# Patient Record
Sex: FEMALE | Race: ASIAN | Employment: UNEMPLOYED | URBAN - METROPOLITAN AREA
[De-identification: names, ages, dates, MRNs, and addresses within clinical notes are randomized per-mention and may not be internally consistent; named-entity substitution may affect disease eponyms.]

---

## 2022-03-05 ENCOUNTER — APPOINTMENT (OUTPATIENT)
Dept: CT IMAGING | Age: 52
End: 2022-03-05
Payer: COMMERCIAL

## 2022-03-05 ENCOUNTER — APPOINTMENT (OUTPATIENT)
Dept: GENERAL RADIOLOGY | Age: 52
End: 2022-03-05
Payer: COMMERCIAL

## 2022-03-05 ENCOUNTER — HOSPITAL ENCOUNTER (OUTPATIENT)
Age: 52
Setting detail: OBSERVATION
Discharge: HOME | End: 2022-03-07
Attending: STUDENT IN AN ORGANIZED HEALTH CARE EDUCATION/TRAINING PROGRAM | Admitting: INTERNAL MEDICINE
Payer: COMMERCIAL

## 2022-03-05 ENCOUNTER — APPOINTMENT (OUTPATIENT)
Dept: MRI IMAGING | Age: 52
End: 2022-03-05
Payer: COMMERCIAL

## 2022-03-05 DIAGNOSIS — R53.1 LEFT-SIDED WEAKNESS: Primary | ICD-10-CM

## 2022-03-05 DIAGNOSIS — K11.6 CYST OF BOTH PAROTID GLANDS: ICD-10-CM

## 2022-03-05 PROBLEM — R29.90 STROKE-LIKE SYMPTOMS: Status: ACTIVE | Noted: 2022-03-05

## 2022-03-05 LAB
A/G RATIO: 1.6 (ref 1.1–2.2)
ALBUMIN SERPL-MCNC: 4.6 G/DL (ref 3.4–5)
ALP BLD-CCNC: 88 U/L (ref 40–129)
ALT SERPL-CCNC: 19 U/L (ref 10–40)
ANION GAP SERPL CALCULATED.3IONS-SCNC: 11 MMOL/L (ref 3–16)
ANION GAP SERPL CALCULATED.3IONS-SCNC: 16 MMOL/L (ref 3–16)
AST SERPL-CCNC: 15 U/L (ref 15–37)
BASOPHILS ABSOLUTE: 0.1 K/UL (ref 0–0.2)
BASOPHILS ABSOLUTE: 0.1 K/UL (ref 0–0.2)
BASOPHILS RELATIVE PERCENT: 1 %
BASOPHILS RELATIVE PERCENT: 1.2 %
BILIRUB SERPL-MCNC: 0.4 MG/DL (ref 0–1)
BUN BLDV-MCNC: 11 MG/DL (ref 7–20)
BUN BLDV-MCNC: 9 MG/DL (ref 7–20)
CALCIUM SERPL-MCNC: 9.3 MG/DL (ref 8.3–10.6)
CALCIUM SERPL-MCNC: 9.6 MG/DL (ref 8.3–10.6)
CHLORIDE BLD-SCNC: 102 MMOL/L (ref 99–110)
CHLORIDE BLD-SCNC: 104 MMOL/L (ref 99–110)
CHOLESTEROL, TOTAL: 191 MG/DL (ref 0–199)
CHP ED QC CHECK: NORMAL
CO2: 20 MMOL/L (ref 21–32)
CO2: 24 MMOL/L (ref 21–32)
CREAT SERPL-MCNC: 0.6 MG/DL (ref 0.6–1.1)
CREAT SERPL-MCNC: 0.8 MG/DL (ref 0.6–1.1)
EKG ATRIAL RATE: 87 BPM
EKG DIAGNOSIS: NORMAL
EKG P AXIS: 58 DEGREES
EKG P-R INTERVAL: 128 MS
EKG Q-T INTERVAL: 342 MS
EKG QRS DURATION: 84 MS
EKG QTC CALCULATION (BAZETT): 411 MS
EKG R AXIS: 9 DEGREES
EKG T AXIS: 10 DEGREES
EKG VENTRICULAR RATE: 87 BPM
EOSINOPHILS ABSOLUTE: 0.2 K/UL (ref 0–0.6)
EOSINOPHILS ABSOLUTE: 0.4 K/UL (ref 0–0.6)
EOSINOPHILS RELATIVE PERCENT: 2.9 %
EOSINOPHILS RELATIVE PERCENT: 5.1 %
GFR AFRICAN AMERICAN: >60
GFR AFRICAN AMERICAN: >60
GFR NON-AFRICAN AMERICAN: >60
GFR NON-AFRICAN AMERICAN: >60
GLUCOSE BLD-MCNC: 108 MG/DL
GLUCOSE BLD-MCNC: 108 MG/DL (ref 70–99)
GLUCOSE BLD-MCNC: 120 MG/DL (ref 70–99)
GLUCOSE BLD-MCNC: 99 MG/DL (ref 70–99)
HCT VFR BLD CALC: 38.8 % (ref 36–48)
HCT VFR BLD CALC: 39.6 % (ref 36–48)
HDLC SERPL-MCNC: 50 MG/DL (ref 40–60)
HEMOGLOBIN: 13.3 G/DL (ref 12–16)
HEMOGLOBIN: 13.4 G/DL (ref 12–16)
INR BLD: 0.91 (ref 0.88–1.12)
LDL CHOLESTEROL CALCULATED: 118 MG/DL
LV EF: 60 %
LVEF MODALITY: NORMAL
LYMPHOCYTES ABSOLUTE: 2.8 K/UL (ref 1–5.1)
LYMPHOCYTES ABSOLUTE: 3.3 K/UL (ref 1–5.1)
LYMPHOCYTES RELATIVE PERCENT: 40.2 %
LYMPHOCYTES RELATIVE PERCENT: 42.6 %
MCH RBC QN AUTO: 30 PG (ref 26–34)
MCH RBC QN AUTO: 30.4 PG (ref 26–34)
MCHC RBC AUTO-ENTMCNC: 33.7 G/DL (ref 31–36)
MCHC RBC AUTO-ENTMCNC: 34.2 G/DL (ref 31–36)
MCV RBC AUTO: 88.9 FL (ref 80–100)
MCV RBC AUTO: 88.9 FL (ref 80–100)
MONOCYTES ABSOLUTE: 0.4 K/UL (ref 0–1.3)
MONOCYTES ABSOLUTE: 0.6 K/UL (ref 0–1.3)
MONOCYTES RELATIVE PERCENT: 6.3 %
MONOCYTES RELATIVE PERCENT: 7.4 %
NEUTROPHILS ABSOLUTE: 3.4 K/UL (ref 1.7–7.7)
NEUTROPHILS ABSOLUTE: 3.4 K/UL (ref 1.7–7.7)
NEUTROPHILS RELATIVE PERCENT: 43.9 %
NEUTROPHILS RELATIVE PERCENT: 49.4 %
PDW BLD-RTO: 13.7 % (ref 12.4–15.4)
PDW BLD-RTO: 13.8 % (ref 12.4–15.4)
PERFORMED ON: ABNORMAL
PLATELET # BLD: 249 K/UL (ref 135–450)
PLATELET # BLD: 266 K/UL (ref 135–450)
PMV BLD AUTO: 7.3 FL (ref 5–10.5)
PMV BLD AUTO: 7.3 FL (ref 5–10.5)
POTASSIUM REFLEX MAGNESIUM: 3.7 MMOL/L (ref 3.5–5.1)
POTASSIUM REFLEX MAGNESIUM: 4.2 MMOL/L (ref 3.5–5.1)
PROTHROMBIN TIME: 10.4 SEC (ref 9.9–12.7)
RBC # BLD: 4.36 M/UL (ref 4–5.2)
RBC # BLD: 4.46 M/UL (ref 4–5.2)
SODIUM BLD-SCNC: 138 MMOL/L (ref 136–145)
SODIUM BLD-SCNC: 139 MMOL/L (ref 136–145)
TOTAL PROTEIN: 7.4 G/DL (ref 6.4–8.2)
TRIGL SERPL-MCNC: 114 MG/DL (ref 0–150)
TROPONIN: <0.01 NG/ML
VLDLC SERPL CALC-MCNC: 23 MG/DL
WBC # BLD: 6.9 K/UL (ref 4–11)
WBC # BLD: 7.8 K/UL (ref 4–11)

## 2022-03-05 PROCEDURE — 36415 COLL VENOUS BLD VENIPUNCTURE: CPT

## 2022-03-05 PROCEDURE — 70496 CT ANGIOGRAPHY HEAD: CPT

## 2022-03-05 PROCEDURE — 85610 PROTHROMBIN TIME: CPT

## 2022-03-05 PROCEDURE — 6370000000 HC RX 637 (ALT 250 FOR IP): Performed by: STUDENT IN AN ORGANIZED HEALTH CARE EDUCATION/TRAINING PROGRAM

## 2022-03-05 PROCEDURE — 9990000010 HC NO CHARGE VISIT: Performed by: PHYSICAL THERAPIST

## 2022-03-05 PROCEDURE — 70450 CT HEAD/BRAIN W/O DYE: CPT

## 2022-03-05 PROCEDURE — 97161 PT EVAL LOW COMPLEX 20 MIN: CPT | Performed by: PHYSICAL THERAPIST

## 2022-03-05 PROCEDURE — 6370000000 HC RX 637 (ALT 250 FOR IP): Performed by: INTERNAL MEDICINE

## 2022-03-05 PROCEDURE — 6360000004 HC RX CONTRAST MEDICATION: Performed by: STUDENT IN AN ORGANIZED HEALTH CARE EDUCATION/TRAINING PROGRAM

## 2022-03-05 PROCEDURE — 71045 X-RAY EXAM CHEST 1 VIEW: CPT

## 2022-03-05 PROCEDURE — 97530 THERAPEUTIC ACTIVITIES: CPT | Performed by: PHYSICAL THERAPIST

## 2022-03-05 PROCEDURE — 70551 MRI BRAIN STEM W/O DYE: CPT

## 2022-03-05 PROCEDURE — 85025 COMPLETE CBC W/AUTO DIFF WBC: CPT

## 2022-03-05 PROCEDURE — G0378 HOSPITAL OBSERVATION PER HR: HCPCS

## 2022-03-05 PROCEDURE — 80061 LIPID PANEL: CPT

## 2022-03-05 PROCEDURE — C8929 TTE W OR WO FOL WCON,DOPPLER: HCPCS

## 2022-03-05 PROCEDURE — 94761 N-INVAS EAR/PLS OXIMETRY MLT: CPT

## 2022-03-05 PROCEDURE — 6360000002 HC RX W HCPCS: Performed by: INTERNAL MEDICINE

## 2022-03-05 PROCEDURE — 97165 OT EVAL LOW COMPLEX 30 MIN: CPT

## 2022-03-05 PROCEDURE — 97530 THERAPEUTIC ACTIVITIES: CPT

## 2022-03-05 PROCEDURE — 93005 ELECTROCARDIOGRAM TRACING: CPT | Performed by: STUDENT IN AN ORGANIZED HEALTH CARE EDUCATION/TRAINING PROGRAM

## 2022-03-05 PROCEDURE — 80053 COMPREHEN METABOLIC PANEL: CPT

## 2022-03-05 PROCEDURE — 99285 EMERGENCY DEPT VISIT HI MDM: CPT

## 2022-03-05 PROCEDURE — 93010 ELECTROCARDIOGRAM REPORT: CPT | Performed by: INTERNAL MEDICINE

## 2022-03-05 PROCEDURE — 83036 HEMOGLOBIN GLYCOSYLATED A1C: CPT

## 2022-03-05 PROCEDURE — 84484 ASSAY OF TROPONIN QUANT: CPT

## 2022-03-05 PROCEDURE — 70491 CT SOFT TISSUE NECK W/DYE: CPT

## 2022-03-05 PROCEDURE — 96372 THER/PROPH/DIAG INJ SC/IM: CPT

## 2022-03-05 RX ORDER — LISINOPRIL 20 MG/1
20 TABLET ORAL DAILY
Status: DISCONTINUED | OUTPATIENT
Start: 2022-03-05 | End: 2022-03-05

## 2022-03-05 RX ORDER — ASPIRIN 300 MG/1
300 SUPPOSITORY RECTAL DAILY
Status: DISCONTINUED | OUTPATIENT
Start: 2022-03-05 | End: 2022-03-07 | Stop reason: HOSPADM

## 2022-03-05 RX ORDER — METOPROLOL SUCCINATE 50 MG/1
50 TABLET, EXTENDED RELEASE ORAL DAILY
Status: DISCONTINUED | OUTPATIENT
Start: 2022-03-05 | End: 2022-03-05

## 2022-03-05 RX ORDER — ONDANSETRON 4 MG/1
4 TABLET, ORALLY DISINTEGRATING ORAL EVERY 8 HOURS PRN
Status: DISCONTINUED | OUTPATIENT
Start: 2022-03-05 | End: 2022-03-07 | Stop reason: HOSPADM

## 2022-03-05 RX ORDER — ASPIRIN 81 MG/1
81 TABLET ORAL DAILY
Status: DISCONTINUED | OUTPATIENT
Start: 2022-03-05 | End: 2022-03-07 | Stop reason: HOSPADM

## 2022-03-05 RX ORDER — ONDANSETRON 2 MG/ML
4 INJECTION INTRAMUSCULAR; INTRAVENOUS EVERY 6 HOURS PRN
Status: DISCONTINUED | OUTPATIENT
Start: 2022-03-05 | End: 2022-03-07 | Stop reason: HOSPADM

## 2022-03-05 RX ORDER — ACETAMINOPHEN 325 MG/1
650 TABLET ORAL EVERY 4 HOURS PRN
Status: DISCONTINUED | OUTPATIENT
Start: 2022-03-05 | End: 2022-03-07 | Stop reason: HOSPADM

## 2022-03-05 RX ORDER — AMLODIPINE BESYLATE 5 MG/1
5 TABLET ORAL DAILY
COMMUNITY

## 2022-03-05 RX ORDER — MAGNESIUM OXIDE 400 MG/1
400 TABLET ORAL DAILY
COMMUNITY

## 2022-03-05 RX ORDER — ZINC GLUCONATE 50 MG
50 TABLET ORAL DAILY
COMMUNITY

## 2022-03-05 RX ORDER — ATORVASTATIN CALCIUM 80 MG/1
80 TABLET, FILM COATED ORAL NIGHTLY
Status: DISCONTINUED | OUTPATIENT
Start: 2022-03-05 | End: 2022-03-07 | Stop reason: HOSPADM

## 2022-03-05 RX ORDER — POLYETHYLENE GLYCOL 3350 17 G/17G
17 POWDER, FOR SOLUTION ORAL DAILY PRN
Status: DISCONTINUED | OUTPATIENT
Start: 2022-03-05 | End: 2022-03-07 | Stop reason: HOSPADM

## 2022-03-05 RX ORDER — LOSARTAN POTASSIUM 50 MG/1
50 TABLET ORAL NIGHTLY
Status: DISCONTINUED | OUTPATIENT
Start: 2022-03-05 | End: 2022-03-07 | Stop reason: HOSPADM

## 2022-03-05 RX ADMIN — ATORVASTATIN CALCIUM 80 MG: 80 TABLET, FILM COATED ORAL at 20:09

## 2022-03-05 RX ADMIN — ENOXAPARIN SODIUM 40 MG: 100 INJECTION SUBCUTANEOUS at 08:46

## 2022-03-05 RX ADMIN — ACETAMINOPHEN 650 MG: 325 TABLET ORAL at 20:09

## 2022-03-05 RX ADMIN — ASPIRIN 81 MG: 81 TABLET, COATED ORAL at 08:46

## 2022-03-05 RX ADMIN — LOSARTAN POTASSIUM 50 MG: 50 TABLET, FILM COATED ORAL at 20:09

## 2022-03-05 RX ADMIN — LIDOCAINE HYDROCHLORIDE: 20 SOLUTION ORAL; TOPICAL at 04:17

## 2022-03-05 RX ADMIN — IOPAMIDOL 100 ML: 755 INJECTION, SOLUTION INTRAVENOUS at 03:30

## 2022-03-05 RX ADMIN — IOPAMIDOL 100 ML: 755 INJECTION, SOLUTION INTRAVENOUS at 14:50

## 2022-03-05 ASSESSMENT — PAIN DESCRIPTION - LOCATION
LOCATION: LEG
LOCATION: LEG;CHEST
LOCATION: CHEST;SHOULDER;LEG
LOCATION: HEAD
LOCATION: CHEST
LOCATION: GENERALIZED

## 2022-03-05 ASSESSMENT — PAIN SCALES - GENERAL
PAINLEVEL_OUTOF10: 6
PAINLEVEL_OUTOF10: 5
PAINLEVEL_OUTOF10: 6
PAINLEVEL_OUTOF10: 3
PAINLEVEL_OUTOF10: 0
PAINLEVEL_OUTOF10: 7
PAINLEVEL_OUTOF10: 6

## 2022-03-05 ASSESSMENT — PAIN DESCRIPTION - ORIENTATION
ORIENTATION: RIGHT;LEFT
ORIENTATION: LEFT
ORIENTATION: RIGHT;LEFT
ORIENTATION: RIGHT;LEFT
ORIENTATION: LEFT

## 2022-03-05 ASSESSMENT — PAIN DESCRIPTION - ONSET
ONSET: ON-GOING

## 2022-03-05 ASSESSMENT — PAIN - FUNCTIONAL ASSESSMENT
PAIN_FUNCTIONAL_ASSESSMENT: ACTIVITIES ARE NOT PREVENTED
PAIN_FUNCTIONAL_ASSESSMENT: 0-10
PAIN_FUNCTIONAL_ASSESSMENT: ACTIVITIES ARE NOT PREVENTED
PAIN_FUNCTIONAL_ASSESSMENT: 0-10
PAIN_FUNCTIONAL_ASSESSMENT: ACTIVITIES ARE NOT PREVENTED

## 2022-03-05 ASSESSMENT — PAIN DESCRIPTION - PAIN TYPE
TYPE: ACUTE PAIN;CHRONIC PAIN
TYPE: ACUTE PAIN
TYPE: ACUTE PAIN
TYPE: CHRONIC PAIN
TYPE: ACUTE PAIN;CHRONIC PAIN

## 2022-03-05 ASSESSMENT — PAIN DESCRIPTION - FREQUENCY
FREQUENCY: INTERMITTENT
FREQUENCY: CONTINUOUS
FREQUENCY: INTERMITTENT
FREQUENCY: INTERMITTENT
FREQUENCY: CONTINUOUS
FREQUENCY: CONTINUOUS

## 2022-03-05 ASSESSMENT — PAIN DESCRIPTION - DESCRIPTORS
DESCRIPTORS: OTHER (COMMENT)
DESCRIPTORS: SHARP;TIGHTNESS
DESCRIPTORS: ACHING
DESCRIPTORS: ACHING
DESCRIPTORS: TIGHTNESS

## 2022-03-05 ASSESSMENT — PAIN DESCRIPTION - PROGRESSION
CLINICAL_PROGRESSION: GRADUALLY WORSENING
CLINICAL_PROGRESSION: GRADUALLY WORSENING
CLINICAL_PROGRESSION: NOT CHANGED
CLINICAL_PROGRESSION: GRADUALLY IMPROVING
CLINICAL_PROGRESSION: NOT CHANGED
CLINICAL_PROGRESSION: NOT CHANGED

## 2022-03-05 NOTE — ED NOTES
[facility] transport team here to take patient to [facility]. Patient is agreeable to transfer. Report is given to transport team and all questions answered.       Christiane Conrad RN  03/05/22 7062

## 2022-03-05 NOTE — PROGRESS NOTES
Occupational Therapy  Attempt Note    Calderon Bradley  3/5/2022    OT orders received. OT attempted to see for OT eval/tx, but was unable to see secondary to pt being out of room at MRI . Will attempt again later as time permits.     Mary Fajardo, OTR/L [id number]

## 2022-03-05 NOTE — PROGRESS NOTES
Medication Reconciliation    List of medications patient is currently taking is complete. Source of information: 1. Conversation with patient at bedside                                      2. EPIC records      Allergies  Codeine and Codeine     Notes regarding home medications:   1. Patient 's prescription medicines include only amlodipine and losartan.  All other prescription medications removed from list.

## 2022-03-05 NOTE — PROGRESS NOTES
NAME:  Esperanza Zaldivar  YOB: 1970  MEDICAL RECORD NUMBER:  [id number]  TODAYS DATE:  03/05/2022    Discussed personal risk factors for Stroke /TIA with patient/family, and ways to reduce the risk for a recurrent stroke. Patient's personal risk factors which were identified are:     [] Alcohol Abuse: check with your physician before any alcohol consumption. [] Atrial fibrillation: may cause blood clots. [] Drug Abuse: Seek help, talk with your doctor  [] Clotting Disorder  [] Diabetes  [x] Family history of stroke or heart disease  [x] High Blood Pressure/Hypertension: work with your physician.  [] High cholesterol: monitor cholesterol levels with your physician.   [] Overweight/Obesity: work with your physician for your ideal body weight.  [] Physical Inactivity: get regular exercise as directed by your physician. [] Personal history of previous TIA or stroke  [x] Poor Diet; decrease salt (sodium) in your diet, follow diet directed by physician. [] Smoking: Cigarette/Cigar: stop smoking. Advised pt. that you can reduce your risk for stroke/TIA by modifying/controlling the risk factors that you have. Pt.advised to take the medications as prescribed, which will be detailed in the discharge instructions, and to not stop taking them without consulting their physician. In addition, pt. advised to maintain a healthy diet, exercise regularly and to not smoke. [facility]'s Stroke treatment and prevention, Managing your recovery  notebook  provided and/or reviewed  with patient/family. The notebook includes, but not limited to, sections addressing warning signs & symptoms of a stroke, which are: sudden numbness or weakness especially on one side of the body, sudden confusion, difficulty speaking or understanding, sudden changes in vision, sudden dizziness or loss of balance/ coordination, or sudden severe headache.   The need to call EMS (911) immediately if signs & symptoms occur is emphasized . The notebook also provides education on Stroke community resources and stroke advocacy. The need for follow-up after discharge was highlighted with patient/family with them being able to repeat understanding of the importance of this.       Electronically signed by Symone Appiah RN

## 2022-03-05 NOTE — ED NOTES
[facility] stroke team called     Marcela Goetz RN  03/05/22 3236

## 2022-03-05 NOTE — ED PROVIDER NOTES
Primary Care Physician: Zaheer Tinajero MD   Attending Physician: Alonso Wells MD     History   Chief Complaint   Patient presents with    Chest Pain     patient reports left chest, shoulder and leg pain since 11pm. She is unsure if she is having a panic attack. JADE Tovar  is a 46 y.o. female history of hypertension who presents complaining of left-sided chest pain as well as shoulder pain as well as leg pain. Patient is also complaining of some weakness on the left side both upper and lower extremity. Patient stated symptoms started around 11 PM.  She stated that she has not been sleeping for the past 2 weeks because she lost a sister at the age of 46 from a heart attack. She stated her dad had a stroke at 61. She stated she believes she is having a panic attack. Past Medical History:   Diagnosis Date    Hypertension         Past Surgical History:   Procedure Laterality Date     SECTION      x 2    TONSILLECTOMY          History reviewed. No pertinent family history. Social History     Socioeconomic History    Marital status:      Spouse name: None    Number of children: None    Years of education: None    Highest education level: None   Occupational History    None   Tobacco Use    Smoking status: Former Smoker    Smokeless tobacco: Former User   Substance and Sexual Activity    Alcohol use: Yes     Comment: occ    Drug use: No    Sexual activity: None   Other Topics Concern    None   Social History Narrative    ** Merged History Encounter **          Social Determinants of Health     Financial Resource Strain:     Difficulty of Paying Living Expenses: Not on file   Food Insecurity:     Worried About Running Out of Food in the Last Year: Not on file    Pedrito of Food in the Last Year: Not on file   Transportation Needs:     Lack of Transportation (Medical): Not on file    Lack of Transportation (Non-Medical):  Not on file   Physical Activity:     Days of Exercise per Week: Not on file    Minutes of Exercise per Session: Not on file   Stress:     Feeling of Stress : Not on file   Social Connections:     Frequency of Communication with Friends and Family: Not on file    Frequency of Social Gatherings with Friends and Family: Not on file    Attends Protestant Services: Not on file    Active Member of [address] [place] [address] or Organizations: Not on file    Attends Club or Organization Meetings: Not on file    Marital Status: Not on file   Intimate Partner Violence:     Fear of Current or Ex-Partner: Not on file    Emotionally Abused: Not on file    Physically Abused: Not on file    Sexually Abused: Not on file   Housing Stability:     Unable to Pay for Housing in the Last Year: Not on file    Number of [place] in the Last Year: Not on file    Unstable Housing in the Last Year: Not on file        Review of Systems   10 total systems reviewed and found to be negative unless otherwise noted in HPI     Physical Exam   BP (!) 144/91   Pulse 90   Temp 98.7 °F (37.1 °C) (Oral)   Resp 18   Ht 5' 2\" (1.575 m)   Wt 161 lb 2.5 oz (73.1 kg)   SpO2 99%   BMI 29.48 kg/m²      CONSTITUTIONAL: Well appearing, in no acute distress   HEAD: atraumatic, normocephalic   EYES: PERRL, No injection, discharge or scleral icterus. ENT: Moist mucous membranes. NECK: Normal ROM, NO LAD   CARDIOVASCULAR: Regular rate and rhythm. No murmurs or gallop. PULMONARY/CHEST: Airway patent. No retractions. Breath sounds clear with good air entry bilaterally. ABDOMEN: Soft, Non-distended and non-tender, without guarding or rebound. SKIN: Acyanotic, warm, dry   MUSCULOSKELETAL: No swelling, tenderness or deformity   NEUROLOGICAL: Awake and oriented x 3. Pulses intact. Grossly nonfocal   Nursing note and vitals reviewed.                                                                                                                    NIH Stroke Scale     Time: 3:57 AM Person Administering Scale: Nsehniitooh J LUIS MD Bruno     Level of consciousness: [0]   0 = Alert;   1 = Not alert;   2 = Not alert;   3 = Responds only with reflex motor or autonomic effects or totally unresponsive, flaccid, and flexic. LOC questions: [0]   0 = Answers both questions correctly. 1 = Answers one question correctly. 2 = Answers neither question correctly. LOC commands: [0]   0 = Performs both tasks correctly. 1 = Performs one task correctly. 2 = Performs neither task correctly. Best Gaze: [ 0 ]   0 = Normal   1 = Partial gaze palsy   2 = Forced deviation     Visual: [0]   0 = No visual loss   1 = Partial hemianopia   2 = Complete hemianopia   3 = Bilateral hemianopia     Facial Palsy: [1]   0 = Normal   1 = Minor paralysis   2 = Partial paralysis   3 = Complete paralysis     Motor left arm: [1]   0 = No drift;   1 = Drift   2 = Some effort against gravity;   3 = No effort against gravity;   4 = No movement. UN = Amputation     Motor right arm: [0]   0 = No drift   1 = Drift   2 = Some effort against gravity   3 = No effort against gravity   4 = No movement   UN = Amputation     Motor left leg: [1]   0 = No drift   1.= Drift   2 = Some effort against gravity   3 = No effort against gravity   4 = No movement. UN = Amputation     Motor right leg: [0]   0 = No drift   1 = Drift   2 = Some effort against gravity   3 = No effort against gravity   4 = No movement   UN = Amputation     Limb Ataxia: [0]   0 = Absent. 1 = Present in one limb. 2 = Present in two limbs   UN = Amputation     Sensory: [0]   0 = Absent   1.= Present in one limb   2 = Present in two limbs   UN = Amputation     Best Language: [0]   0 = No aphasia   1 = Mild-to-moderate aphasia   2 = Severe aphasia   3 = Mute, global aphasia     Dysarthria: [0]   1 = Mild-to-moderate dysarthria   2 = Severe dysarthria   UN = Intubated     Extinction and Inattention: [0]   0 = No abnormality.    1 = Visual, tactile, auditory, spatial, or personal inattention   2 = Profound paulino-inattention or extinction to more than one modality     TOTAL: Fredi.Dagoberto ]       ED Course & Medical Decision Making   Medications   iopamidol (ISOVUE-370) 76 % injection 100 mL (100 mLs IntraVENous Given 3/5/22 0330)      Labs Reviewed   POCT GLUCOSE - Abnormal; Notable for the following components:       Result Value    POC Glucose 108 (*)     All other components within normal limits   POCT GLUCOSE - Normal   CBC WITH AUTO DIFFERENTIAL   TROPONIN   PROTIME-INR   COMPREHENSIVE METABOLIC PANEL W/ REFLEX TO MG FOR LOW K      XR CHEST PORTABLE   Final Result   No acute process. Nodular density projecting over the left lower lung zone, possibly   artifactual.  Suggest correlation with nonemergent chest CT to better   characterize. CTA HEAD NECK W CONTRAST   Final Result   No evidence of large vessel occlusion or hemodynamically significant stenosis   involving the head and neck arteries. RECOMMENDATIONS:   Unavailable         CT HEAD WO CONTRAST   Final Result   No acute intracranial abnormality. EKG INTERPRETATION:  EKG by my preliminary interpretation shows sinus rhythm with rate of 87, normal axis, normal intervals, with no ST changes indicative of ischemia at this time. Nonspecific abnormalities aVF and lead III. PROCEDURES:   Procedures    ASSESSMENT AND PLAN:  [id number], Calderon Bradley is a 46 y.o. female history of hypertension who presents complaining of left-sided chest pain as well as shoulder pain as well as leg pain. Patient is also complaining of some weakness on the left side both upper and lower extremity. Patient stated symptoms started around 11 PM.  She stated that she has not been sleeping for the past 2 weeks because she lost a sister at the age of 46 from a heart attack. She stated her dad had a stroke at 61. She stated she believes she is having a panic attack. On exam patient hemodynamic stable. However she has asymmetry of the face and weak on the left upper as well as some drift in left upper. Eye exam was concerning for stroke. NIH subscale was 3. Stroke protocol was initiated. Patient was noted candidate for TPA given the fact that she presented almost out of the window and her symptoms are noticeable. Nonetheless a CT of the head and CTA head and they will. I showed no abnormal findings. EKG with no ischemic changes but some nonspecific changes in lead III and aVF. Labs so far including troponin are normal.  Despite normal work-up patient symptoms are concerning for stroke versus conversion disorder given the fact that she is going through a lot of stress. Hospitalist has been consulted pending admission. Of note stroke team did not recommend TPA given the mild symptoms and the fact the patient was  out of window. ClINICAL IMPRESSION:  1. Left-sided weakness        DISPOSITION    Admit   -Findings and recommendations explained to patient. She expressed understanding and agreed with the plan.    ___________________________________________________________________________________  _________________________________________________________________________________________  This record is transcribed utilizing voice recognition technology. There are inherent limitations in this technology. In addition, there may be limitations in editing of this report. If there are any discrepancies, please contact me directly.             Sheryl Woody MD  03/05/22 0426

## 2022-03-05 NOTE — PROGRESS NOTES
Occupational Therapy   Occupational Therapy Initial Assessment and Discharge  Date: 3/5/2022   Patient Name: Mariah Vaughan  MRN: [id number]     : 1970    Date of Service: 3/5/2022    Discharge Recommendations:  Home with assist PRN     Mariah Vaughan scored a 24/24 on the AM-PAC ADL Inpatient form. At this time, no further OT is recommended upon discharge due to pt functioning at/near baseline. Recommend patient returns to prior setting with prior services. Assessment   Assessment: Pt presents to be functioning at/near baseline level of independent, and does not require any additional acute OT. Pt does have L shoulder pain limiting pt's end range shoulder flexion and L shoulder strength, however, strength/ROM still WFL for purpose of ADLs as pt observed to be using L UE functionally during ADLs without difficulty. Anticipate pt will be safe to return home with assist prn. No acute OT goals identified, will discharge. Prognosis: Good  Decision Making: Low Complexity  OT Education: OT Role  REQUIRES OT FOLLOW UP: No  Activity Tolerance  Activity Tolerance: Patient Tolerated treatment well  Safety Devices  Safety Devices in place: Yes  Type of devices: Call light within reach; Left in bed           Patient Diagnosis(es): The encounter diagnosis was Left-sided weakness. has a past medical history of Hypertension. has a past surgical history that includes  section and Tonsillectomy. Restrictions       Subjective   General  Chart Reviewed: Yes  Additional Pertinent Hx: Per Conrad Carr MD's ED note 3/5: \"Domonique Andrea  is a 46 y.o. female history of hypertension who presents complaining of left-sided chest pain as well as shoulder pain as well as leg pain. Patient is also complaining of some weakness on the left side both upper and lower extremity.   Patient stated symptoms started around 11 PM.  She stated that she has not been sleeping for the past 2 weeks because she lost a sister at the age of 46 from a heart attack. She stated her dad had a stroke at 61. She stated she believes she is having a panic attack. \"  Family / Caregiver Present: No  Referring Practitioner: Madison Bautista MD  Subjective  Subjective: Pt met b/s for OT eval/tx. Pt UAL in BR on arrival, agreeable to participate in therapy. Pt reports pain L LE, but that pain L shoulder resolved. However, with MMT, pt reported L shoulder pain beginning to act up again. Social/Functional History  Social/Functional History  Lives With: Friend(s),Family  Type of Home: House  Home Layout: Multi-level  Home Access: Stairs to enter with rails  ADL Assistance: Independent  Homemaking Assistance: Independent  Ambulation Assistance: Independent  Transfer Assistance: Independent       Objective   Balance  Sitting Balance: Independent  Standing Balance: Independent  Functional Mobility  Functional - Mobility Device: No device  Activity: To/from bathroom; Other  Assist Level: Independent  Functional Mobility Comments: Pt completed fxl mobility to/from BR and around room with no AD independently. ADL  LE Dressing: Independent (to don/doff socks)  Toileting: Independent  Additional Comments: Anticipate pt is independent with all ADLs based on ROM/strength, balance, endurance. Tone RUE  RUE Tone: Normotonic  Tone LUE  LUE Tone: Normotonic  Coordination  Movements Are Fluid And Coordinated: Yes     Bed mobility  Supine to Sit: Independent  Sit to Supine: Independent     Transfers  Sit to stand: Independent  Stand to sit:  Independent   Toilet Transfers  Toilet - Technique: Ambulating  Toilet Transfer: Independent    Cognition  Overall Cognitive Status: WFL       LUE AROM (degrees)  LUE AROM : WFL  LUE General AROM: shoulder flexion lacks end range, but still WFL for purpose of ADLs  RUE AROM (degrees)  RUE AROM : WFL     LUE Strength  Gross LUE Strength: WFL  L Shoulder Flex: 4/5 (pt reports pain L shoulder with MMT, still 4/5 strength)  L Elbow Flex: 5/5  L Hand General: 4+/5  RUE Strength  Gross RUE Strength: WNL                   Plan   Plan  Times per week: d/c acute OT      AM-PAC Score        AM-[facility] Inpatient Daily Activity Raw Score: 24 (03/05/22 1129)  AM-PAC Inpatient ADL T-Scale Score : 57.54 (03/05/22 1129)  ADL Inpatient CMS 0-100% Score: 0 (03/05/22 1129)  ADL Inpatient CMS G-Code Modifier : [address] (03/05/22 1129)    Goals  Short term goals  Time Frame for Short term goals: no acute OT goals identified.        Therapy Time   Individual Concurrent Group Co-treatment   Time In 0154         Time Out 0526         Minutes 23         Timed Code Treatment Minutes: [address], OTR/L 8345

## 2022-03-05 NOTE — CONSULTS
Neurology Consult Note  Reason for Consult: stroke like symptoms    Chief complaint: \"chest pain\"    Dr Eran Escamilla MD asked me to see Nelson Locke in consultation for evaluation of stroke like symptoms. History of Present Illness:  I obtained my information via the patient, supplemented with chart review. Nelson Locke is a 46 y.o. female with hx of HTN who presented to the ED on 3/5/22 for evaluation of left sided chest, shoulder and leg pain with left upper and lower limb weakness. The patient reported that the night of admission, around 2300 she had noted to have acute left chest and shoulder pain. She felt like she was having a panic attack. The patient had reported left arm and leg weakness. Initially felt in chart to possibly be acute. She tells me today that she has been having persistent not improving left sided weakness, though later tells me it has been intermittent, and fluctuating. She also reported abnormal sensation to her left side, difficult to describe but she does endorse left calf pain \"like a jamil horse\". She complains of left shoulder pain. No neck or back pain. No speech or vision changes. She has not been recently sick. She has not been sleeping well the last 2 weeks, Her sister recently  at the age of 46 from a heart attack, she also reported that her father had a stroke in his 63's. She is also currently going through a divorce, she lost her job, and had a uterine surgery. She tells me she is in menopause and is not on hormone replacement therapy. On arrival to the ED BP was 144/ 91. NIHSS 3 for left sided weakness. CT head was without acute intracranial findings. CTA head & Neck was without significant stenosis or LVO. Case was discussed with stroke team for which patient was not a candidate for TPA. Home medications listed in chart include 81mg ASA. Does not appear to be on statin therapy.     MRI Brain was completed prior to encounter and awaiting results. She reports that she feels about the same. Medical History:  Past Medical History:   Diagnosis Date    Hypertension      Past Surgical History:   Procedure Laterality Date     SECTION      x 2    TONSILLECTOMY       Scheduled Meds:   metoprolol succinate  50 mg Oral Daily    losartan  50 mg Oral Nightly    lisinopril  20 mg Oral Daily    enoxaparin  40 mg SubCUTAneous Daily    aspirin  81 mg Oral Daily    Or    aspirin  300 mg Rectal Daily    atorvastatin  80 mg Oral Nightly     Continuous Infusions:  PRN Meds:.ondansetron **OR** ondansetron, polyethylene glycol, acetaminophen    Medications Prior to Admission: losartan (COZAAR) 50 MG tablet, Take 50 mg by mouth nightly  Multiple Vitamins-Minerals (THERAPEUTIC MULTIVITAMIN-MINERALS) tablet, Take 1 tablet by mouth daily  calcium carbonate (OSCAL) 500 MG TABS tablet, Take 500 mg by mouth daily  vitamin B-12 (CYANOCOBALAMIN) 500 MCG tablet, Take 250 mcg by mouth daily  Ascorbic Acid (VITAMIN C) 250 MG tablet, Take 250 mg by mouth daily  nebivolol (BYSTOLIC) 5 MG tablet, Take 5 mg by mouth daily. lisinopril (PRINIVIL;ZESTRIL) 20 MG tablet, Take 20 mg by mouth daily. Allergies   Allergen Reactions    Codeine Shortness Of Breath    Codeine        History reviewed. No pertinent family history. Social History     Tobacco Use   Smoking Status Former Smoker   Smokeless Tobacco Former User     Social History     Substance and Sexual Activity   Drug Use No     Social History     Substance and Sexual Activity   Alcohol Use Yes    Comment: occ       ROS:  Constitutional- No weight loss or fevers  Eyes- No diplopia. No photophobia. Ears/nose/throat- No dysphagia. No Dysarthria  Cardiovascular- No palpitations. + chest pain  Respiratory- No dyspnea. No Cough  Gastrointestinal- No Abdominal pain. No Vomiting. Genitourinary- No incontinence.  No urinary retention  Musculoskeletal- + left calf myalgia. + left shoulder arthralgia No neck or back pain  Skin- No rash. No easy bruising. Psychiatric- + depression. + anxiety  Endocrine- No diabetes. No thyroid issues. Hematologic- No bleeding difficulty. No fatigue  Neurologic- + left weakness. No Headache. Exam:  Vitals:    03/05/22 0527 03/05/22 0531 03/05/22 0741 03/05/22 0757   BP:  (!) 143/85     Pulse:  70     Resp:  18  18   Temp:  98.3 °F (36.8 °C) 98.1 °F (36.7 °C)    TempSrc:  Oral Oral    SpO2:  95%  95%   Weight: 158 lb 1.1 oz (71.7 kg)      Height: 5' 2\" (1.575 m)         Constitutional    Vital signs: BP, HR, and RR reviewed   General Alert, no distress, well-nourished  Eyes: unable to visualize the fundi  Cardiovascular: pulses symmetric in all 4 extremities. No peripheral edema. Psychiatric: cooperative with examination, no  psychotic behavior noted. Neurologic  Mental status:   orientation to person, place, time and situation   General fund of knowledge:  grossly intact   Memory: Short term and long term intact   Attention intact as able to attend well to the exam     Language fluent in conversation. No aphasia    Comprehension intact; follows simple commands  Cranial nerves:   CN2: Visual Fields grossly full w/o extinction on confrontational testing. She does give inconsistent answers. CN 3,4,6: extraocular muscles intact, PERRLA @ 3mm. CN5: V1: V2: V3: with inconsistent answer to light touch sensation bilaterally  CN7: upper and lower facial symmetric without obvious dysarthria  CN8: hearing grossly intact to conversation. CN9/10: palate elevated symmetrically  CN11: trap full strength on shoulder shrug bilaterally, SCM without weakness. CN12: tongue midline with protrusion. Able to move tongue side to side. Strength: Grasp: BUE 5/5 best attempt . RUE: 5/5 Shoulder abduction, elbow flexion, elbow extension. LUE: 5-  Shoulder abduction, elbow flexion, elbow extension. Mild LUE drift.    Dorsiflexion: R 5/5, L 5/5 ;  Plantar flexion: R 5/5, L 5/5  RLE: 5/5 Hip flexion, Knee flexion, Knee extension. LLE: 5-/5 Hip flexion, Knee flexion, Knee extension. Deep tendon reflexes:   R Bicep: 2+  R Brachioradialis: 2+  R Patellar: 2+    L Bicep 2+ L Brachioradialis[de-identified] 2+  L Patellar: 2+      Sensory: light touch sensation limited given inconsistent answers. No complete loss of sensation. No sensory extinction on double simultaneous stimulation  Cerebellar/coordination: finger nose finger normal without ataxia  Tone: normal in all 4 extremities  Gait: normal gait    Labs  Na: 139  K: 4.2  BUN: 9  Creatinine: 0.6  Glucose: 99  Calcium: 9.3    Troponin: <0.01    HbA1c: pending  LDL: 118    ALT: 19  AST: 15    WBC: 6.9  RBC: 4.46  Hgb: 13.4  Hct: 39.6  Platelet: 383    Studies  MRI Brain w/o 3/5/22: Pending. CT Head w/o 3/5/22: Independently reviewed. No acute intracranial abnormality. CTA Head & Neck w/ 3/5/22: Reviewed the read. No evidence of large vessel occlusion or hemodynamically significant stenosis involving the head and neck arteries. EKG: 3/5/22: Normal sinus rhythm. Impression  1. Left hemiparesis. 2. Acute chest pain  3. Left shoulder pain   4. Left calf myalgia  5. HTN  6. Anxiety    Karlie Wood is a 46 y.o. female with hx of HTN who presented with acute chest pain, left shoulder pain, felt like she was having a panic attack. She had reported left sided weakness which timeline and pattern is not clear. Possibly ongoing for the last couple of weeks. She reports left calf myalgia which is chronic. She reports significant stress and anxiety in her life, she is greiving the loss of her sister, she is going through a divorce, she lost her job several months ago and also recently had a uterine surgery. Clinical exam is difficult inconsistent answers. Some subtle left arm and leg weakness is noted at times though improves with encouragement. No obvious CN deficit. Etiology: Important to rule out recent stroke.  Other considerations include cervical

## 2022-03-05 NOTE — PROGRESS NOTES
Physical Therapy    Facility/Department: [address] PROGRESSIVE CARE  Initial Assessment/Discharge Note    NAME: Crissy December  : 1970  MRN: [id number]    Date of Service: 3/5/2022    Discharge Recommendations:  Home with assist PRN   PT Equipment Recommendations  Equipment Needed: No  Domonique Encarnacion scored a 23/24 on the AM-PAC short mobility form. At this time, no further PT is recommended upon discharge. Recommend patient returns to prior setting with prior services. Assessment   Assessment: pt is a 45 yo female who was adm to hosp with chest pain, numbness and L sided weakness; pt's sister just passed away with a heart attack and pt reports she may have had a panic attack; pt appears to be close to her baseline; anticipate once pt medically safe to be discharged that she will be safe to return home without any further therapy  Prognosis: Good  Decision Making: Low Complexity  PT Education: PT Role  Barriers to Learning: pt has some anxiety  No Skilled PT: Safe to return home  REQUIRES PT FOLLOW UP: No  Activity Tolerance  Activity Tolerance: Patient Tolerated treatment well       Patient Diagnosis(es): The encounter diagnosis was Left-sided weakness. has a past medical history of Hypertension. has a past surgical history that includes  section and Tonsillectomy. Restrictions     Vision/Hearing  Vision: Within Functional Limits  Hearing: Within functional limits     Subjective  General  Chart Reviewed: Yes  Additional Pertinent Hx: per ER note: \"Domonique Encarnacion  is a 46 y.o. female history of hypertension who presents complaining of left-sided chest pain as well as shoulder pain as well as leg pain. Patient is also complaining of some weakness on the left side both upper and lower extremity. Patient stated symptoms started around 11 PM.  She stated that she has not been sleeping for the past 2 weeks because she lost a sister at the age of 46 from a heart attack.   She stated her dad had a stroke at 61. She stated she believes she is having a panic attack. \"  Response To Previous Treatment: Not applicable  Family / Caregiver Present: No  Referring Practitioner: Dr Darvin Pompa  Referral Date : 03/05/22  Follows Commands: Within Functional Limits  Subjective  Subjective: pt reports pain in L shoulder and L lower leg; pt agreeable to working with therapy  Pain Screening  Patient Currently in Pain: Yes          Orientation  Orientation  Overall Orientation Status: Within Functional Limits  Social/Functional History  Social/Functional History  Lives With: Friend(s),Family  Type of Home: House  Home Layout: Multi-level  Home Access: Stairs to enter with rails  ADL Assistance: Independent  Homemaking Assistance: Independent  Ambulation Assistance: Independent  Transfer Assistance: Independent  Cognition   Cognition  Overall Cognitive Status: WFL    Objective          AROM RLE (degrees)  RLE AROM: WFL  AROM LLE (degrees)  LLE AROM : WFL  Strength RLE  Strength RLE: WFL  Strength LLE  Strength LLE: WFL  Strength Other  Other: pt was not giving full effort at times on her L but with telling pt to push into therapist hand was able to demonstrate good strength        Bed mobility  Supine to Sit: Independent  Sit to Supine: Independent  Transfers  Sit to Stand: Independent  Stand to sit:  Independent  Ambulation  Ambulation?: Yes  Ambulation 1  Surface: level tile  Device: No Device  Assistance: Independent  Quality of Gait: no LOB  Gait Deviations: None  Distance: 10' and 20'  Comments: pt left in room with neurology evaluating pt     Balance  Sitting - Static: Good  Sitting - Dynamic: Good  Standing - Static: Good  Standing - Dynamic: Good        Plan   Plan  Plan Comment: no further therapy indicated  Safety Devices  Type of devices: Call light within reach,Left in bed,Nurse notified,All fall risk precautions in place    G-Code       OutComes Score                                                  AM-PAC Score  AM-PAC Inpatient Mobility Raw Score : 23 (03/05/22 1128)  AM-PAC Inpatient T-Scale Score : 56.93 (03/05/22 1128)  Mobility Inpatient CMS 0-100% Score: 11.2 (03/05/22 1128)  Mobility Inpatient CMS G-Code Modifier : CI (03/05/22 1128)          Goals          Therapy Time   Individual Concurrent Group Co-treatment   Time In 1105         Time Out 1129         Minutes 24                 ISAI GARCIA PT    Electronically signed by ISAI GARCIA PT on 3/5/2022 at 11:29 AM

## 2022-03-05 NOTE — PROGRESS NOTES
Patient admitted to room 5117  from [facility]. Patient oriented to room, call light, bed rails, phone, lights and bathroom. Patient instructed about the schedule of the day including: vital sign frequency, lab draws, possible tests, frequency of MD and staff rounds, daily weights, I &O's and prescribed diet. Telemetry box in place, patient aware of placement and reason. Bed locked, in lowest position, side rails up 2/4, call light within reach. 4 Eyes Skin Assessment     The patient is being assess for   Admission    I agree that 2 RN's have performed a thorough Head to Toe Skin Assessment on the patient. ALL assessment sites listed below have been assessed. Areas assessed for pressure by both nurses:   [x]   Head, Face, and Ears   [x]   Shoulders, Back, and Chest, Abdomen  [x]   Arms, Elbows, and Hands   [x]   Coccyx, Sacrum, and Ischium  [x]   Legs, Feet, and Heels          Does the Patient have Skin Breakdown related to pressure?   No   Og Prevention initiated:  No   Wound Care Orders initiated:  No      [facility] nurse consulted for Pressure Injury (Stage 3,4, Unstageable, DTI, NWPT, Complex wounds)and New or Established Ostomies:  No      Primary Nurse eSignature: Electronically signed by Krystal Woodward RN on 3/5/22 at 6:07 AM EST    **SHARE this note so that the co-signing nurse is able to place an eSignature**    Co-signer eSignature: Electronically signed by Megan Reyes RN on 3/5/22 at 6:12 AM EST

## 2022-03-05 NOTE — PLAN OF CARE
Problem: HEMODYNAMIC STATUS  Goal: Patient has stable vital signs and fluid balance  3/5/2022 0901 by José Miguel Ramírez RN  Outcome: Ongoing     Problem: ACTIVITY INTOLERANCE/IMPAIRED MOBILITY  Goal: Mobility/activity is maintained at optimum level for patient  3/5/2022 0901 by José Miguel Ramírez RN  Outcome: Ongoing     Problem: COMMUNICATION IMPAIRMENT  Goal: Ability to express needs and understand communication  3/5/2022 0901 by José Miguel Ramírez RN  Outcome: Ongoing     Problem: Pain:  Goal: Pain level will decrease  Description: Pain level will decrease  Outcome: Ongoing

## 2022-03-06 LAB
ANION GAP SERPL CALCULATED.3IONS-SCNC: 16 MMOL/L (ref 3–16)
BASOPHILS ABSOLUTE: 0.1 K/UL (ref 0–0.2)
BASOPHILS RELATIVE PERCENT: 1.3 %
BUN BLDV-MCNC: 15 MG/DL (ref 7–20)
CALCIUM SERPL-MCNC: 9.3 MG/DL (ref 8.3–10.6)
CHLORIDE BLD-SCNC: 106 MMOL/L (ref 99–110)
CO2: 20 MMOL/L (ref 21–32)
CREAT SERPL-MCNC: 0.7 MG/DL (ref 0.6–1.1)
EOSINOPHILS ABSOLUTE: 0.4 K/UL (ref 0–0.6)
EOSINOPHILS RELATIVE PERCENT: 5.9 %
ESTIMATED AVERAGE GLUCOSE: 116.9 MG/DL
GFR AFRICAN AMERICAN: >60
GFR NON-AFRICAN AMERICAN: >60
GLUCOSE BLD-MCNC: 106 MG/DL (ref 70–99)
HBA1C MFR BLD: 5.7 %
HCT VFR BLD CALC: 38.1 % (ref 36–48)
HEMOGLOBIN: 12.9 G/DL (ref 12–16)
LYMPHOCYTES ABSOLUTE: 2.6 K/UL (ref 1–5.1)
LYMPHOCYTES RELATIVE PERCENT: 42.4 %
MCH RBC QN AUTO: 30.2 PG (ref 26–34)
MCHC RBC AUTO-ENTMCNC: 33.8 G/DL (ref 31–36)
MCV RBC AUTO: 89.2 FL (ref 80–100)
MONOCYTES ABSOLUTE: 0.5 K/UL (ref 0–1.3)
MONOCYTES RELATIVE PERCENT: 8.1 %
NEUTROPHILS ABSOLUTE: 2.6 K/UL (ref 1.7–7.7)
NEUTROPHILS RELATIVE PERCENT: 42.3 %
PDW BLD-RTO: 13.9 % (ref 12.4–15.4)
PLATELET # BLD: 252 K/UL (ref 135–450)
PMV BLD AUTO: 7.4 FL (ref 5–10.5)
POTASSIUM REFLEX MAGNESIUM: 4.1 MMOL/L (ref 3.5–5.1)
RBC # BLD: 4.27 M/UL (ref 4–5.2)
SODIUM BLD-SCNC: 142 MMOL/L (ref 136–145)
T4 FREE: 1.4 NG/DL (ref 0.9–1.8)
TSH SERPL DL<=0.05 MIU/L-ACNC: 1.36 UIU/ML (ref 0.27–4.2)
WBC # BLD: 6.2 K/UL (ref 4–11)

## 2022-03-06 PROCEDURE — G0378 HOSPITAL OBSERVATION PER HR: HCPCS

## 2022-03-06 PROCEDURE — 80048 BASIC METABOLIC PNL TOTAL CA: CPT

## 2022-03-06 PROCEDURE — 6370000000 HC RX 637 (ALT 250 FOR IP): Performed by: INTERNAL MEDICINE

## 2022-03-06 PROCEDURE — 85025 COMPLETE CBC W/AUTO DIFF WBC: CPT

## 2022-03-06 PROCEDURE — 84439 ASSAY OF FREE THYROXINE: CPT

## 2022-03-06 PROCEDURE — 36415 COLL VENOUS BLD VENIPUNCTURE: CPT

## 2022-03-06 PROCEDURE — 84443 ASSAY THYROID STIM HORMONE: CPT

## 2022-03-06 RX ORDER — CHOLECALCIFEROL (VITAMIN D3) 125 MCG
500 CAPSULE ORAL DAILY
Status: DISCONTINUED | OUTPATIENT
Start: 2022-03-06 | End: 2022-03-07 | Stop reason: HOSPADM

## 2022-03-06 RX ORDER — LANOLIN ALCOHOL/MO/W.PET/CERES
400 CREAM (GRAM) TOPICAL DAILY
Status: DISCONTINUED | OUTPATIENT
Start: 2022-03-06 | End: 2022-03-07 | Stop reason: HOSPADM

## 2022-03-06 RX ORDER — ZINC SULFATE 50(220)MG
50 CAPSULE ORAL DAILY
Status: DISCONTINUED | OUTPATIENT
Start: 2022-03-06 | End: 2022-03-07 | Stop reason: HOSPADM

## 2022-03-06 RX ORDER — ASCORBIC ACID 500 MG
250 TABLET ORAL DAILY
Status: DISCONTINUED | OUTPATIENT
Start: 2022-03-06 | End: 2022-03-07 | Stop reason: HOSPADM

## 2022-03-06 RX ADMIN — OXYCODONE HYDROCHLORIDE AND ACETAMINOPHEN 250 MG: 500 TABLET ORAL at 11:12

## 2022-03-06 RX ADMIN — LOSARTAN POTASSIUM 50 MG: 50 TABLET, FILM COATED ORAL at 20:02

## 2022-03-06 RX ADMIN — Medication 400 MG: at 11:12

## 2022-03-06 RX ADMIN — ATORVASTATIN CALCIUM 80 MG: 80 TABLET, FILM COATED ORAL at 20:01

## 2022-03-06 RX ADMIN — ACETAMINOPHEN 650 MG: 325 TABLET ORAL at 20:01

## 2022-03-06 RX ADMIN — CYANOCOBALAMIN TAB 500 MCG 500 MCG: 500 TAB at 11:12

## 2022-03-06 RX ADMIN — ASPIRIN 81 MG: 81 TABLET, COATED ORAL at 09:23

## 2022-03-06 RX ADMIN — ZINC SULFATE 220 MG (50 MG) CAPSULE 50 MG: CAPSULE at 11:12

## 2022-03-06 ASSESSMENT — PAIN DESCRIPTION - FREQUENCY: FREQUENCY: INTERMITTENT

## 2022-03-06 ASSESSMENT — PAIN DESCRIPTION - PAIN TYPE: TYPE: ACUTE PAIN

## 2022-03-06 ASSESSMENT — PAIN DESCRIPTION - DESCRIPTORS: DESCRIPTORS: HEADACHE

## 2022-03-06 ASSESSMENT — PAIN SCALES - GENERAL
PAINLEVEL_OUTOF10: 0
PAINLEVEL_OUTOF10: 2

## 2022-03-06 ASSESSMENT — PAIN DESCRIPTION - ONSET: ONSET: ON-GOING

## 2022-03-06 ASSESSMENT — PAIN DESCRIPTION - LOCATION: LOCATION: HEAD

## 2022-03-06 NOTE — PROGRESS NOTES
Hospitalist Progress Note      PCP: Milton Westbrook MD    Date of Admission: 3/5/2022    Chief Complaint: left sided weakness    Hospital Course: The patient is a 46 y.o. female with hx HTN who presents to [facility] with left-sided weakness. Patient states that last night she felt that the room was closing in on her, developed chest pain, shortness of breath, and sense of doom. She then felt an abnormal sensation to her left arm and leg, and they became weaker than her right side. Denies fever, chills, headache, abdominal pain, nausea, vomiting, constipation, diarrhea, and dysuria. Patient has been under a significant amount of stress lately. States her sister  a couple of weeks ago. She is also going through a divorce, lost her job, and her sons are going through therapy as well.     In the ED, labs were unremarkable. CT Head without contrast showed no acute intracranial abnormality. CTA Head and Neck without contrast showed no evidence of large vessel occlusion or hemodynamically significant stenosis. CXR showed a nodular density projecting over the left lower lung zone. Subjective: Pt seen and examined. She is feeling better today. Got a full nights sleep.         Medications:  Reviewed    Infusion Medications   Scheduled Medications    magnesium oxide  400 mg Oral Daily    zinc sulfate  50 mg Oral Daily    vitamin B-12  500 mcg Oral Daily    vitamin C  250 mg Oral Daily    losartan  50 mg Oral Nightly    enoxaparin  40 mg SubCUTAneous Daily    aspirin  81 mg Oral Daily    Or    aspirin  300 mg Rectal Daily    atorvastatin  80 mg Oral Nightly     PRN Meds: ondansetron **OR** ondansetron, polyethylene glycol, acetaminophen      Intake/Output Summary (Last 24 hours) at 3/6/2022 1151  Last data filed at 3/5/2022 1402  Gross per 24 hour   Intake 240 ml   Output 500 ml   Net -260 ml       Exam:    /78   Pulse 83   Temp 97.7 °F (36.5 °C) (Oral)   Resp 16   Ht 5' 2\" (1.575 m)   Wt 159 lb 2.8 oz (72.2 kg)   SpO2 95%   BMI 29.11 kg/m²     General appearance: No apparent distress, appears stated age and cooperative. HEENT: Pupils equal, round, and reactive to light. Conjunctivae/corneas clear. Neck: Supple, with full range of motion. No jugular venous distention. Trachea midline. Respiratory:  Normal respiratory effort. Clear to auscultation, bilaterally without Rales/Wheezes/Rhonchi. Cardiovascular: Regular rate and rhythm with normal S1/S2 without murmurs, rubs or gallops. Abdomen: Soft, non-tender, non-distended with normal bowel sounds. Musculoskeletal: No clubbing, cyanosis or edema bilaterally. Full range of motion without deformity. Skin: Skin color, texture, turgor normal.  No rashes or lesions. Neurologic:  Neurovascularly intact without any focal sensory/motor deficits. Cranial nerves: II-XII intact, grossly non-focal.  Psychiatric: Alert and oriented, thought content appropriate, normal insight  Capillary Refill: Brisk,< 3 seconds   Peripheral Pulses: +2 palpable, equal bilaterally       Labs:   Recent Labs     03/05/22  0301 03/05/22  0710 03/06/22  0453   WBC 7.8 6.9 6.2   HGB 13.3 13.4 12.9   HCT 38.8 39.6 38.1    266 252     Recent Labs     03/05/22  0301 03/05/22  0710 03/06/22  0453    139 142   K 3.7 4.2 4.1    104 106   CO2 20* 24 20*   BUN 11 9 15   CREATININE 0.8 0.6 0.7   CALCIUM 9.6 9.3 9.3     Recent Labs     03/05/22  0301   AST 15   ALT 19   BILITOT 0.4   ALKPHOS 88     Recent Labs     03/05/22  0301   INR 0.91     Recent Labs     03/05/22  0301   TROPONINI <0.01       Urinalysis:    No results found for: Janus Alexander, BACTERIA, RBCUA, BLOODU, SPECGRAV, GLUCOSEU    Radiology:  CT SOFT TISSUE NECK W CONTRAST   Final Result   Bilateral parotid glands contain multiple solid lesions bilaterally. The   lesions are variable in size 6 mm to 13 mm.  Differential possibilities   include Warthin tumor and multiple lymph node among other possibilities. No other mass lesion, abnormal collection or pathologically enlarged neck   lymphadenopathy. MRI brain without contrast   Final Result   1. No acute intracranial abnormality. No acute infarct. 2. Small cystic appearing lesions within the parotid gland bilaterally   measuring up to 9 mm on the right and 13 mm on the left. These may represent   abnormal intraparotid lymph nodes versus primary parotid lesions. XR CHEST PORTABLE   Final Result   No acute process. Nodular density projecting over the left lower lung zone, possibly   artifactual.  Suggest correlation with nonemergent chest CT to better   characterize. CTA HEAD NECK W CONTRAST   Final Result   No evidence of large vessel occlusion or hemodynamically significant stenosis   involving the head and neck arteries. RECOMMENDATIONS:   Unavailable         CT HEAD WO CONTRAST   Final Result   No acute intracranial abnormality. MRI CERVICAL SPINE WO CONTRAST    (Results Pending)           Assessment/Plan:    Active Hospital Problems    Diagnosis Date Noted    Stroke-like symptoms [R29.90] 03/05/2022       Left sided weakness - unclear etiology. ? Coversion disorder. CT and CTA Head were negative.  -neurology consulted  -MRI Brain without contrast ordered and showed no acute intracranial abnormality.  -Echo with bubble study showed no valvular abnormalities and no evidence of shunting  -ASA and statin  -neurochecks  -PT/OT efforts  -further recs per neurology  -MRI Cervical Spine without contrast to be completed tomorrow     Atypical chest pain - doubt ACS.  May have had a panic attack yesterday  -no further workup     Incidental parotid cysts  -checked CT soft tissue neck with contrast  -ENT consulted    Nodular density over the left lower lung zone  -check CT Chest without contrast      Anxiety and depression  -talked at length with patient  -recommend follow-up with psychiatry at discharge     Essential hypertension  -continue home meds    DVT Prophylaxis: Lovenox  Diet: ADULT DIET;  Regular  Code Status: Full Code    PT/OT Eval Status: ordered    Dispo - continue care    Haris Paula MD

## 2022-03-07 ENCOUNTER — APPOINTMENT (OUTPATIENT)
Dept: CT IMAGING | Age: 52
End: 2022-03-07
Payer: COMMERCIAL

## 2022-03-07 ENCOUNTER — TELEPHONE (OUTPATIENT)
Dept: ENT CLINIC | Age: 52
End: 2022-03-07

## 2022-03-07 ENCOUNTER — APPOINTMENT (OUTPATIENT)
Dept: MRI IMAGING | Age: 52
End: 2022-03-07
Payer: COMMERCIAL

## 2022-03-07 VITALS
OXYGEN SATURATION: 93 % | WEIGHT: 159.17 LBS | TEMPERATURE: 98.1 F | HEART RATE: 69 BPM | DIASTOLIC BLOOD PRESSURE: 83 MMHG | HEIGHT: 62 IN | BODY MASS INDEX: 29.29 KG/M2 | RESPIRATION RATE: 23 BRPM | SYSTOLIC BLOOD PRESSURE: 135 MMHG

## 2022-03-07 LAB
ANION GAP SERPL CALCULATED.3IONS-SCNC: 14 MMOL/L (ref 3–16)
BASOPHILS ABSOLUTE: 0.1 K/UL (ref 0–0.2)
BASOPHILS RELATIVE PERCENT: 0.9 %
BUN BLDV-MCNC: 14 MG/DL (ref 7–20)
CALCIUM SERPL-MCNC: 9.5 MG/DL (ref 8.3–10.6)
CHLORIDE BLD-SCNC: 105 MMOL/L (ref 99–110)
CO2: 22 MMOL/L (ref 21–32)
CREAT SERPL-MCNC: 0.7 MG/DL (ref 0.6–1.1)
EOSINOPHILS ABSOLUTE: 0.4 K/UL (ref 0–0.6)
EOSINOPHILS RELATIVE PERCENT: 6 %
GFR AFRICAN AMERICAN: >60
GFR NON-AFRICAN AMERICAN: >60
GLUCOSE BLD-MCNC: 100 MG/DL (ref 70–99)
HCT VFR BLD CALC: 39.3 % (ref 36–48)
HEMOGLOBIN: 13.2 G/DL (ref 12–16)
LYMPHOCYTES ABSOLUTE: 2.7 K/UL (ref 1–5.1)
LYMPHOCYTES RELATIVE PERCENT: 41.8 %
MCH RBC QN AUTO: 29.7 PG (ref 26–34)
MCHC RBC AUTO-ENTMCNC: 33.5 G/DL (ref 31–36)
MCV RBC AUTO: 88.7 FL (ref 80–100)
MONOCYTES ABSOLUTE: 0.6 K/UL (ref 0–1.3)
MONOCYTES RELATIVE PERCENT: 9.1 %
NEUTROPHILS ABSOLUTE: 2.7 K/UL (ref 1.7–7.7)
NEUTROPHILS RELATIVE PERCENT: 42.2 %
PDW BLD-RTO: 14.1 % (ref 12.4–15.4)
PLATELET # BLD: 226 K/UL (ref 135–450)
PMV BLD AUTO: 7.3 FL (ref 5–10.5)
POTASSIUM REFLEX MAGNESIUM: 4.3 MMOL/L (ref 3.5–5.1)
RBC # BLD: 4.43 M/UL (ref 4–5.2)
SODIUM BLD-SCNC: 141 MMOL/L (ref 136–145)
WBC # BLD: 6.5 K/UL (ref 4–11)

## 2022-03-07 PROCEDURE — 72141 MRI NECK SPINE W/O DYE: CPT

## 2022-03-07 PROCEDURE — G0378 HOSPITAL OBSERVATION PER HR: HCPCS

## 2022-03-07 PROCEDURE — 6370000000 HC RX 637 (ALT 250 FOR IP): Performed by: INTERNAL MEDICINE

## 2022-03-07 PROCEDURE — 71250 CT THORAX DX C-: CPT

## 2022-03-07 PROCEDURE — 36415 COLL VENOUS BLD VENIPUNCTURE: CPT

## 2022-03-07 PROCEDURE — 85025 COMPLETE CBC W/AUTO DIFF WBC: CPT

## 2022-03-07 PROCEDURE — 99218 PR INITIAL OBSERVATION CARE/DAY 30 MINUTES: CPT | Performed by: OTOLARYNGOLOGY

## 2022-03-07 PROCEDURE — 80048 BASIC METABOLIC PNL TOTAL CA: CPT

## 2022-03-07 RX ADMIN — OXYCODONE HYDROCHLORIDE AND ACETAMINOPHEN 250 MG: 500 TABLET ORAL at 09:01

## 2022-03-07 RX ADMIN — ASPIRIN 81 MG: 81 TABLET, COATED ORAL at 09:01

## 2022-03-07 RX ADMIN — Medication 400 MG: at 09:01

## 2022-03-07 RX ADMIN — CYANOCOBALAMIN TAB 500 MCG 500 MCG: 500 TAB at 09:01

## 2022-03-07 RX ADMIN — ZINC SULFATE 220 MG (50 MG) CAPSULE 50 MG: CAPSULE at 09:01

## 2022-03-07 RX ADMIN — ACETAMINOPHEN 650 MG: 325 TABLET ORAL at 00:01

## 2022-03-07 ASSESSMENT — PAIN SCALES - GENERAL
PAINLEVEL_OUTOF10: 0
PAINLEVEL_OUTOF10: 3
PAINLEVEL_OUTOF10: 0

## 2022-03-07 NOTE — CARE COORDINATION
INITIAL CASE MANAGEMENT ASSESSMENT    Met with patient to assess possible discharge needs. Explained Case Management role/services. Living Situation: Lives with spouse and children in a two story house with 5 LOGAN. ADLs: Independent     DME: N/A    PT/OT Recs: No recommendations     Active Services: N/A     Transportation: Not an active . States she takes the bus. States her  and son both drive however they both work full-time. Medications: [employer] [phone]    PCP: Lucía Caicedo      HD/PD: N/A    PLAN/COMMENTS: CM met with the patient to discuss discharge planning. No needs identified. Discharge plan is to return home with family. Family to provide transportation at discharge. Provided contact information for patient or family to call with any questions. Will follow and assist as needed.     Roberta DOUGHERTY RN  Case Management  [phone]    Electronically signed by Roberta Rush RN on 3/7/2022 at 4:06 PM

## 2022-03-07 NOTE — TELEPHONE ENCOUNTER
parotid masses needs to be seen left a voice mail at [phone] to call the office 2 weeks Dr Olvera said

## 2022-03-07 NOTE — DISCHARGE SUMMARY
Hospital Medicine Discharge Summary    Patient ID: Gina Guillen      Patient's PCP: Mariaelena Rivero MD    Admit Date: 3/5/2022     Discharge Date:   3/7/2022    Admitting Physician: Randi Millan MD     Discharge Physician: Perla Sawyer MD     Discharge Diagnoses: Active Hospital Problems    Diagnosis Date Noted    Stroke-like symptoms [R29.90] 2022       The patient was seen and examined on day of discharge and this discharge summary is in conjunction with any daily progress note from day of discharge. Hospital Course: The patient is a 46 y.o. female with hx HTN who presents to [facility] with left-sided weakness. Patient states that last night she felt that the room was closing in on her, developed chest pain, shortness of breath, and sense of doom. She then felt an abnormal sensation to her left arm and leg, and they became weaker than her right side. Denies fever, chills, headache, abdominal pain, nausea, vomiting, constipation, diarrhea, and dysuria. Patient has been under a significant amount of stress lately. States her sister  a couple of weeks ago. She is also going through a divorce, lost her job, and her sons are going through therapy as well.     In the ED, labs were unremarkable. CT Head without contrast showed no acute intracranial abnormality. CTA Head and Neck without contrast showed no evidence of large vessel occlusion or hemodynamically significant stenosis. CXR showed a nodular density projecting over the left lower lung zone. Left sided weakness - unclear etiology. ? Coversion disorder.  CT and CTA Head were negative.  -neurology consulted  -MRI Brain without contrast ordered and showed no acute intracranial abnormality.  -Echo with bubble study showed no valvular abnormalities and no evidence of shunting  -ASA and statin  -neurochecks  -PT/OT efforts  -further recs per neurology  -MRI Cervical Spine without contrast showed mild cervical spondylosis likely not explaining her sympotms  -she can follow-up with neurology for consideration of EMG     Atypical chest pain - doubt ACS. May have had a panic attack yesterday  -no further workup  -advised mammogram and follow-up with psychiatry at discharge     Incidental parotid cysts  -checked CT soft tissue neck with contrast  -ENT consulted, will see in 1-2 weeks as an outpatient  -consider Rheumatology follow-up     Nodular density over the left lower lung zone  -check CT Chest without contrast showed no questionable nodules  -no further workup     Anxiety and depression  -talked at length with patient  -recommend follow-up with psychiatry at discharge     Essential hypertension  -continue home meds      Exam:     /83   Pulse 69   Temp 98.1 °F (36.7 °C)   Resp 23   Ht 5' 2\" (1.575 m)   Wt 159 lb 2.8 oz (72.2 kg)   SpO2 93%   BMI 29.11 kg/m²       General appearance:  No apparent distress, appears stated age and cooperative. HEENT:  Normal cephalic, atraumatic without obvious deformity. Pupils equal, round, and reactive to light. Extra ocular muscles intact. Conjunctivae/corneas clear. Neck: Supple, with full range of motion. No jugular venous distention. Trachea midline. Respiratory:  Normal respiratory effort. Clear to auscultation, bilaterally without Rales/Wheezes/Rhonchi. Cardiovascular:  Regular rate and rhythm with normal S1/S2 without murmurs, rubs or gallops. Abdomen: Soft, non-tender, non-distended with normal bowel sounds. Musculoskeletal:  No clubbing, cyanosis or edema bilaterally. Full range of motion without deformity. Skin: Skin color, texture, turgor normal.  No rashes or lesions. Neurologic:  Neurovascularly intact without any focal sensory/motor deficits.  Cranial nerves: II-XII intact, grossly non-focal.  Psychiatric:  Alert and oriented, thought content appropriate, normal insight  Capillary Refill: Brisk,< 3 seconds   Peripheral Pulses: +2 palpable, equal bilaterally       Labs: For convenience and continuity at follow-up the following most recent labs are provided:      CBC:    Lab Results   Component Value Date    WBC 6.5 03/07/2022    HGB 13.2 03/07/2022    HCT 39.3 03/07/2022     03/07/2022       Renal:    Lab Results   Component Value Date     03/07/2022    K 4.3 03/07/2022     03/07/2022    CO2 22 03/07/2022    BUN 14 03/07/2022    CREATININE 0.7 03/07/2022    CALCIUM 9.5 03/07/2022         Significant Diagnostic Studies    Radiology:   MRI CERVICAL SPINE WO CONTRAST   Preliminary Result   Mild cervical spondylosis. CT CHEST WO CONTRAST   Final Result   No suspicious pulmonary nodule seen to account for the findings on recent   chest x-ray      Small ground-glass nodule in the right upper lobe      RECOMMENDATIONS:   Fleischner Society guidelines for follow-up and management of incidentally   detected subsolid pulmonary nodules:      Solitary ground glass nodule   < 6 mm - No routine follow-up.   > than or equal to 6 mm - CT at 6-12 months to confirm persistence, then CT   every 2 years until 5 years. - Low risk patients include individuals with minimal or absent history of   smoking and other known risk factors. - High risk patients include individuals with a history or smoking or known   risk factors. Radiology 2017 http://pubs. rsna.org/doi/full/10.1148/radiol. 3822025536         CT SOFT TISSUE NECK W CONTRAST   Final Result   Bilateral parotid glands contain multiple solid lesions bilaterally. The   lesions are variable in size 6 mm to 13 mm. Differential possibilities   include Warthin tumor and multiple lymph node among other possibilities. No other mass lesion, abnormal collection or pathologically enlarged neck   lymphadenopathy. MRI brain without contrast   Final Result   1. No acute intracranial abnormality. No acute infarct.    2. Small cystic appearing lesions within the parotid gland bilaterally measuring up to 9 mm on the right and 13 mm on the left. These may represent   abnormal intraparotid lymph nodes versus primary parotid lesions. XR CHEST PORTABLE   Final Result   No acute process. Nodular density projecting over the left lower lung zone, possibly   artifactual.  Suggest correlation with nonemergent chest CT to better   characterize. CTA HEAD NECK W CONTRAST   Final Result   No evidence of large vessel occlusion or hemodynamically significant stenosis   involving the head and neck arteries. RECOMMENDATIONS:   Unavailable         CT HEAD WO CONTRAST   Final Result   No acute intracranial abnormality. Consults:     IP CONSULT TO PHARMACY  PHARMACY TO CHANGE BASE FLUIDS  IP CONSULT TO HOSPITALIST  IP CONSULT TO NEUROLOGY  IP CONSULT TO SPIRITUAL SERVICES  IP CONSULT TO OTOLARYNGOLOGY    Disposition:  home     Condition at Discharge: Stable    Discharge Instructions/Follow-up:  meds as prescribed, follow-up with ENT, neurology, PCP; consider follow-up with psychiatry and Rheumatology    Code Status:  Full Code     Activity: activity as tolerated    Diet: regular diet      Discharge Medications:     Current Discharge Medication List           Details   magnesium oxide (MAG-OX) 400 MG tablet Take 400 mg by mouth daily      zinc gluconate 50 MG tablet Take 50 mg by mouth daily      amLODIPine (NORVASC) 5 MG tablet Take 5 mg by mouth daily      losartan (COZAAR) 50 MG tablet Take 50 mg by mouth nightly      vitamin B-12 (CYANOCOBALAMIN) 500 MCG tablet Take 500 mcg by mouth daily       Ascorbic Acid (VITAMIN C) 250 MG tablet Take 250 mg by mouth daily             Time Spent on discharge is more than 30 minutes in the examination, evaluation, counseling and review of medications and discharge plan. Signed:    Lucina Suarez MD   3/7/2022      Thank you Cristhian Valdez MD for the opportunity to be involved in this patient's care.  If you have any questions or concerns please feel free to contact me at [phone].

## 2022-03-07 NOTE — PROGRESS NOTES
Progress Note  The patient is being evaluated for left sided weaknes. Updates  Patient reports she is feeling significantly better since last seen. Feels close if not at her baseline. Awaiting MRI C spine for left sided weakness. Active Ambulatory Problems     Diagnosis Date Noted    No Active Ambulatory Problems     Resolved Ambulatory Problems     Diagnosis Date Noted    No Resolved Ambulatory Problems     Past Medical History:   Diagnosis Date    Hypertension        Current Facility-Administered Medications:     magnesium oxide (MAG-OX) tablet 400 mg, 400 mg, Oral, Daily, Javon Mccoy MD, 400 mg at 03/07/22 0901    zinc sulfate (ZINCATE) capsule 50 mg, 50 mg, Oral, Daily, Javon Mccoy MD, 50 mg at 03/07/22 0901    vitamin B-12 (CYANOCOBALAMIN) tablet 500 mcg, 500 mcg, Oral, Daily, Javon Mccoy MD, 500 mcg at 03/07/22 0901    ascorbic acid (VITAMIN C) tablet 250 mg, 250 mg, Oral, Daily, Javon Mccoy MD, 250 mg at 03/07/22 0901    losartan (COZAAR) tablet 50 mg, 50 mg, Oral, Nightly, Kel Goyal MD, 50 mg at 03/06/22 2002    ondansetron (ZOFRAN-ODT) disintegrating tablet 4 mg, 4 mg, Oral, Q8H PRN **OR** ondansetron (ZOFRAN) injection 4 mg, 4 mg, IntraVENous, Q6H PRN, Kel Goyal MD    polyethylene glycol [facility]) packet 17 g, 17 g, Oral, Daily PRN, Kel Goyal MD    enoxaparin (LOVENOX) injection 40 mg, 40 mg, SubCUTAneous, Daily, Kel Goyal MD, 40 mg at 03/05/22 0846    aspirin EC tablet 81 mg, 81 mg, Oral, Daily, 81 mg at 03/07/22 0901 **OR** aspirin suppository 300 mg, 300 mg, Rectal, Daily, Kel Goyal MD    acetaminophen (TYLENOL) tablet 650 mg, 650 mg, Oral, Q4H PRN, Kel Goyal MD, 650 mg at 03/07/22 0001    atorvastatin (LIPITOR) tablet 80 mg, 80 mg, Oral, Nightly, Kel Goyal MD, 80 mg at 03/06/22 2001      ROS -   Constitutional- No fevers  Eyes- No diplopia. No photophobia. Ears/nose/throat- No dysphagia.  No Dysarthria  Cardiovascular- No palpitations. No chest pain  Respiratory- No dyspnea. No Cough  Genitourinary- No incontinence. No urinary retention  Musculoskeletal- No myalgia. No arthralgia  Neurologic- No weakness. No Headache.  magnesium oxide  400 mg Oral Daily    zinc sulfate  50 mg Oral Daily    vitamin B-12  500 mcg Oral Daily    vitamin C  250 mg Oral Daily    losartan  50 mg Oral Nightly    enoxaparin  40 mg SubCUTAneous Daily    aspirin  81 mg Oral Daily    Or    aspirin  300 mg Rectal Daily    atorvastatin  80 mg Oral Nightly             ondansetron **OR** ondansetron, polyethylene glycol, acetaminophen     Exam:  Blood pressure 113/67, pulse 70, temperature 98.1 °F (36.7 °C), resp. rate 15, height 5' 2\" (1.575 m), weight 159 lb 2.8 oz (72.2 kg), SpO2 98 %. Constitutional    Vital signs: BP, HR, and RR reviewed   General Alert, no distress, well-nourished  Eyes: unable to visualize the fundi  Cardiovascular: pulses symmetric in all 4 extremities. No peripheral edema. Psychiatric: cooperative with examination, no  psychotic behavior noted. Neurologic  Mental status:   orientation to person, place, time and situation   General fund of knowledge:  grossly intact   Memory: Short term and long term intact   Attention intact as able to attend well to the exam     Language fluent in conversation   Comprehension intact; follows simple commands  Cranial nerves:   CN2: Visual Fields full w/o extinction on confrontational testing  CN 3,4,6: extraocular muscles intact  CN7: upper and lower facial symmetric without dysarthria  CN8: hearing grossly intact to conversation. Strength:   BUE: 5/5, LUE minimally weaker comparatively. BLE: 4+/5. LLE minimally weaker comparatively. Deep tendon reflexes:    Present, symmetric throughout. No anand's pincher response. Babinski testing toes inconclusive. Sensory: light touch intact in all 4 extremities.    Cerebellar/coordination: finger nose finger normal without ataxia  Tone: normal in all 4 extremities  Gait: normal gait      Labs  Na: 141  K: 4.3  BUN: 14  Creatinine: 0.7  Glucose: 100  Ca: 9.5    LDL: 118  HbA1c:5.7  TSH: 1.36    ALT: 19  AST: 15    WBC: 6.5  Hgb: 13.2  Platelet: 496      Studies  MRI Brain w/o 3/5/22: Independently reviewed. 1. No acute intracranial abnormality. No acute infarct. 2. Small cystic appearing lesions within the parotid gland bilaterally measuring up to 9 mm on the right and 13 mm on the left. These may represent abnormal intraparotid lymph nodes versus primary parotid lesions. CT Head w/o 3/5/22: No acute intracranial abnormality.     CTA Head & Neck w/ 3/5/22: No evidence of large vessel occlusion or hemodynamically significant stenosis involving the head and neck arteries.       EKG: 3/5/22: Normal sinus rhythm. CT Chest w/o 3/7/22: No suspicious pulmonary nodule seen to account for the findings on recent chest x-ray   Small ground-glass nodule in the right upper lobe     Impression  1. Left hemiparesis. 2. Acute chest pain  3. Left shoulder pain   4. Left calf myalgia  5. HTN  6. Anxiety     Amarilis Angeles is a 46 y.o. female with hx of HTN who presented with acute chest pain, left shoulder pain, felt like she was having a panic attack. She had reported left sided weakness which timeline and pattern is not clear. Possibly ongoing for the last couple of weeks. May also have positional component. She also had previously reported left calf myalgia which is chronic.      She reports significant stress and anxiety in her life, she is greiving the loss of her sister, she is going through a divorce, she lost her job several months ago and also recently had a uterine surgery. MRI Brain was unrevealing for acute intracranial findings, no obvious source.      Clinical exam is difficult, some inconsistent answers at times during encounter. Today she feels like she is back to baseline.  Some subtle left arm and leg weakness is noted at times though improves with encouragement. No obvious CN deficit.      Etiology: Will evaluate for cervical etiology, patient has had neck trauma. Clinical exam does not reveal any findings concerning for myelopathy . Recommendations  - Await MRI Cervical spine w/o. Hopefully today. If unrevealing will sign off.   - PT, OT  - Patient either way would likely benefit from outpatient therapy/counseling given her recent life events. - If her symptoms persist, can consider EMG as outpatient. - Your medical management otherwise.    - Follow up with Dr. Ricardo Pritchard as needed    Prieto Romero, [address] Neurology    A copy of this note was provided for Dr Haritha Gabriel MD

## 2022-03-07 NOTE — CONSULTS
[facility]      Patient Name: Nelson Lokce  Medical Record Number:  [id number]  Primary Care Physician:  Clinton Lang MD  Date of Consultation: 3/7/2022    Chief Complaint: Parotid lesions        HISTORY OF PRESENT ILLNESS  Rehan Kruger is a(n) 46 y.o. female who was admitted with left-sided weakness on March 5, 2022. During that work-up some incidentally discovered parotid lesions were noted. The patient says she had no idea that she had any parotid masses. She denies any pain of her face. No facial weakness. No significant history of rheumatologic disorders. No history of HIV. REVIEW OF SYSTEMS  As above    PHYSICAL EXAM  GENERAL: No Acute Distress, Alert and Oriented, no Hoarseness, strong voice  EYES: EOMI, Anti-icteric  HENT:   Head: Normocephalic and atraumatic. Face: Mass palpated just anterior to the left ear. Overlying skin changes. Nontender to palpation. I cannot feel anything on the right. Facial nerve is intact. Right Ear: Normal external ear, normal external auditory canal, intact tympanic membrane with normal mobility and aerated middle ear  Left Ear: Normal external ear, normal external auditory canal, intact tympanic membrane with normal mobility and aerated middle ear  Mouth/Oral Cavity:  normal lips, Uvula is midline, no mucosal lesions, no trismus  Oropharynx/Larynx:  normal oropharynx  Nose:Normal external nasal appearance. NECK: Normal range of motion, no thyromegaly, trachea is midline      I reviewed the patient's CT scan of the neck with contrast.  She has multiple small parotid nodules larger on the left than the right.     ASSESSMENT/PLAN  Parotid lesions.-Unsure etiology, would favor lymph nodes given the number of them.    -Could consider screening for rheumatologic disorder such as Sjogren's or lupus.  -Could be a primary parotid neoplasm such as a Warthin's tumor, however she did stop smoking several years ago and given the number of different lesions this would be fairly unlikely  -I think the best initial treatment would be a short-term follow-up CT scan to see if these are changing. If they are the same size or larger, we would need to get an ultrasound-guided biopsy. This can be performed as an outpatient. Recommended follow-up in 1 to 2 weeks. I have performed a head and neck physical exam personally or was physically present during the key or critical portions of the service. This note was generated completely or in part utilizing Dragon dictation speech recognition software. Occasionally, words are mistranscribed and despite editing, the text may contain inaccuracies due to incorrect word recognition. If further clarification is needed please contact the office at [phone].

## 2022-03-07 NOTE — FLOWSHEET NOTE
03/07/22 1516   Encounter Summary   Services provided to: Patient   Continue Visiting   ([facility] / [facility] [place] [facility])   Complexity of Encounter Moderate   Length of Encounter 45 minutes   Spiritual/Voodoo   Type Spiritual support   Assessment Calm; Approachable;Grieving; Anxious; Helplessness;Unresolved grief;Concerns with suffering   Intervention Active listening;Explored feelings, thoughts, concerns;Explored coping resources;Nurtured hope;Prayer;Scripture;Sustaining presence/ Ministry of presence;Grief care; Discussed meaning/purpose;Discussed relationship with God;Discussed belief system/Caodaism practices/alejo;Discussed illness/injury and it's impact   Outcome Connection/belonging;Comfort;Expressed gratitude;Engaged in conversation; Shared life review;Expressed feelings/needs/concerns; Less anxious, less agitated;Grieving;Receptive;Venting emotion      responded to consult request.  Patient shared she is Jehovah's witness and would also like to speak with a .   Taylor Reed was notified today and states he will visit this patient.     Electronically signed by Vin Rivas on 3/7/2022 at 3:34 PM

## 2022-03-07 NOTE — TELEPHONE ENCOUNTER
----- Message from Rene Carbajal MD sent at 3/7/2022 12:56 PM EST -----  Regarding: f/u  In 2 weeks    Thanks,  Alex Wilson

## 2022-03-07 NOTE — PLAN OF CARE
Problem: Pain:  Goal: Pain level will decrease  Description: Pain level will decrease  Outcome: Ongoing   Pain/discomfort being managed with PRN analgesics per MD orders. Pt able to express presence and absence of pain and rate pain appropriately using numerical scale.        Problem: HEMODYNAMIC STATUS  Goal: Patient has stable vital signs and fluid balance  Outcome: Ongoing

## 2022-03-07 NOTE — CARE COORDINATION
CASE MANAGEMENT DISCHARGE SUMMARY: Discharge order noted. No needs identified.      DISCHARGE DATE: 3/7/22     DISCHARGED TO HOME     TRANSPORTATION: Family             TIME: Evening     PREFERRED PHARMACY: "[facility]"   PHONE: [phone]              Lata DOUGHERTY RN  Case Management  [id number]    Electronically signed by Lata Stern RN on 3/7/2022 at 4:15 PM

## 2022-05-31 NOTE — H&P
Hospital Medicine History & Physical      PCP: Cristhian Valdez MD    Date of Admission: 3/5/2022    Date of Service: Pt seen/examined on 3/5/2022 and Placed in Observation. Chief Complaint:  Left sided weakness      History Of Present Illness: The patient is a 46 y.o. female with hx HTN who presents to [facility] with left-sided weakness. Patient states that last night she felt that the room was closing in on her, developed chest pain, shortness of breath, and sense of doom. She then felt an abnormal sensation to her left arm and leg, and they became weaker than her right side. Denies fever, chills, headache, abdominal pain, nausea, vomiting, constipation, diarrhea, and dysuria. Patient has been under a significant amount of stress lately. States her sister  a couple of weeks ago. She is also going through a divorce, lost her job, and her sons are going through therapy as well. In the ED, labs were unremarkable. CT Head without contrast showed no acute intracranial abnormality. CTA Head and Neck without contrast showed no evidence of large vessel occlusion or hemodynamically significant stenosis. CXR showed a nodular density projecting over the left lower lung zone. Past Medical History:        Diagnosis Date    Hypertension        Past Surgical History:        Procedure Laterality Date     SECTION      x 2    TONSILLECTOMY         Medications Prior to Admission:    Prior to Admission medications    Medication Sig Start Date End Date Taking?  Authorizing Provider   losartan (COZAAR) 50 MG tablet Take 50 mg by mouth nightly   Yes Historical Provider, MD   Multiple Vitamins-Minerals (THERAPEUTIC MULTIVITAMIN-MINERALS) tablet Take 1 tablet by mouth daily    Historical Provider, MD   calcium carbonate (OSCAL) 500 MG TABS tablet Take 500 mg texture, turgor normal.  No rashes or lesions. Neurologic: Alert and oriented X 3, neurovascularly intact with sensory/motor intact upper extremities/lower extremities, bilaterally except 4+/5 LUE, LLE strength. Cranial nerves: II-XII intact, grossly non-focal.  Mental status: Alert, oriented, thought content appropriate. Capillary Refill: Acceptable  < 3 seconds  Peripheral Pulses: +3 Easily felt, not easily obliterated with pressure      CXR:  I have reviewed the CXR with the following interpretation: nodular density in left lower lung  EKG:  I have reviewed the EKG with the following interpretation: NSR    MRI brain without contrast   Final Result   1. No acute intracranial abnormality. No acute infarct. 2. Small cystic appearing lesions within the parotid gland bilaterally   measuring up to 9 mm on the right and 13 mm on the left. These may represent   abnormal intraparotid lymph nodes versus primary parotid lesions. XR CHEST PORTABLE   Final Result   No acute process. Nodular density projecting over the left lower lung zone, possibly   artifactual.  Suggest correlation with nonemergent chest CT to better   characterize. CTA HEAD NECK W CONTRAST   Final Result   No evidence of large vessel occlusion or hemodynamically significant stenosis   involving the head and neck arteries. RECOMMENDATIONS:   Unavailable         CT HEAD WO CONTRAST   Final Result   No acute intracranial abnormality.          CT SOFT TISSUE NECK W CONTRAST    (Results Pending)         CBC   Recent Labs     03/05/22  0301 03/05/22  0710   WBC 7.8 6.9   HGB 13.3 13.4   HCT 38.8 39.6    266      RENAL  Recent Labs     03/05/22  0301 03/05/22  0710    139   K 3.7 4.2    104   CO2 20* 24   BUN 11 9   CREATININE 0.8 0.6     LFT'S  Recent Labs     03/05/22  0301   AST 15   ALT 19   BILITOT 0.4   ALKPHOS 88     COAG  Recent Labs     03/05/22  0301   INR 0.91     CARDIAC ENZYMES  Recent Labs 03/05/22  0301   TROPONINI <0.01       U/A:  No results found for: NITRITE, COLORU, WBCUA, RBCUA, MUCUS, BACTERIA, CLARITYU, SPECGRAV, LEUKOCYTESUR, BLOODU, GLUCOSEU, AMORPHOUS    ABG  No results found for: XFM2BUR, BEART, S7VILOTQ, PHART, THGBART, PFA3AWO, PO2ART, SFN6EWA        Active Hospital Problems    Diagnosis Date Noted    Stroke-like symptoms [R29.90] 03/05/2022         PHYSICIANS CERTIFICATION:    I certify that Lonny Juarez is expected to be hospitalized for more than 2 midnights based on the following assessment and plan:      ASSESSMENT/PLAN:      Left sided weakness - unclear etiology. ? Coversion disorder. CT and CTA Head were negative.  -neurology consulted  -MRI Brain without contrast ordered and showed no acute intracranial abnormality.  -Echo with bubble study showed no valvular abnormalities and no evidence of shunting  -ASA and statin  -neurochecks  -PT/OT efforts  -further recs per neurology    Atypical chest pain - doubt ACS. May have had a panic attack yesterday  -no further workup    Incidental parotid cysts  -check CT soft tissue neck with contrast and consider ENT eval    Anxiety and depression  -talked at length with patient  -recommend follow-up with psychiatry at discharge    Essential hypertension  -continue home meds     DVT Prophylaxis: Lovenox  Diet: ADULT DIET; Regular  Code Status: Full Code  PT/OT Eval Status: ordered    Dispo - continue care       Lucina Suarez MD    Thank you Cristhian Valdez MD for the opportunity to be involved in this patient's care. If you have any questions or concerns please feel free to contact me at [phone]. distal radial